# Patient Record
Sex: FEMALE | Race: BLACK OR AFRICAN AMERICAN | NOT HISPANIC OR LATINO | Employment: OTHER | URBAN - METROPOLITAN AREA
[De-identification: names, ages, dates, MRNs, and addresses within clinical notes are randomized per-mention and may not be internally consistent; named-entity substitution may affect disease eponyms.]

---

## 2018-07-16 LAB — RAPID GROUP A STREP (OHS): NEGATIVE

## 2019-08-02 LAB — RAPID GROUP A STREP (OHS): NEGATIVE

## 2022-04-11 ENCOUNTER — HISTORICAL (OUTPATIENT)
Dept: ADMINISTRATIVE | Facility: HOSPITAL | Age: 62
End: 2022-04-11
Payer: COMMERCIAL

## 2022-04-26 VITALS — DIASTOLIC BLOOD PRESSURE: 81 MMHG | OXYGEN SATURATION: 97 % | SYSTOLIC BLOOD PRESSURE: 131 MMHG

## 2022-05-04 ENCOUNTER — OFFICE VISIT (OUTPATIENT)
Dept: URGENT CARE | Facility: CLINIC | Age: 62
End: 2022-05-04
Payer: COMMERCIAL

## 2022-05-04 VITALS
OXYGEN SATURATION: 97 % | BODY MASS INDEX: 34.52 KG/M2 | TEMPERATURE: 99 F | SYSTOLIC BLOOD PRESSURE: 141 MMHG | HEART RATE: 68 BPM | WEIGHT: 202.19 LBS | HEIGHT: 64 IN | DIASTOLIC BLOOD PRESSURE: 86 MMHG | RESPIRATION RATE: 18 BRPM

## 2022-05-04 DIAGNOSIS — R09.81 NASAL CONGESTION: ICD-10-CM

## 2022-05-04 DIAGNOSIS — Z11.52 ENCOUNTER FOR SCREENING FOR COVID-19: Primary | ICD-10-CM

## 2022-05-04 LAB
FLUAV AG UPPER RESP QL IA.RAPID: NOT DETECTED
FLUBV AG UPPER RESP QL IA.RAPID: NOT DETECTED
SARS-COV-2 RNA RESP QL NAA+PROBE: NOT DETECTED

## 2022-05-04 PROCEDURE — 1159F MED LIST DOCD IN RCRD: CPT | Mod: CPTII,,, | Performed by: NURSE PRACTITIONER

## 2022-05-04 PROCEDURE — 1159F PR MEDICATION LIST DOCUMENTED IN MEDICAL RECORD: ICD-10-PCS | Mod: CPTII,,, | Performed by: NURSE PRACTITIONER

## 2022-05-04 PROCEDURE — 3079F DIAST BP 80-89 MM HG: CPT | Mod: CPTII,,, | Performed by: NURSE PRACTITIONER

## 2022-05-04 PROCEDURE — 99214 OFFICE O/P EST MOD 30 MIN: CPT | Mod: PBBFAC | Performed by: NURSE PRACTITIONER

## 2022-05-04 PROCEDURE — 99203 OFFICE O/P NEW LOW 30 MIN: CPT | Mod: S$PBB,,, | Performed by: NURSE PRACTITIONER

## 2022-05-04 PROCEDURE — 3008F BODY MASS INDEX DOCD: CPT | Mod: CPTII,,, | Performed by: NURSE PRACTITIONER

## 2022-05-04 PROCEDURE — 3008F PR BODY MASS INDEX (BMI) DOCUMENTED: ICD-10-PCS | Mod: CPTII,,, | Performed by: NURSE PRACTITIONER

## 2022-05-04 PROCEDURE — 3079F PR MOST RECENT DIASTOLIC BLOOD PRESSURE 80-89 MM HG: ICD-10-PCS | Mod: CPTII,,, | Performed by: NURSE PRACTITIONER

## 2022-05-04 PROCEDURE — 1160F PR REVIEW ALL MEDS BY PRESCRIBER/CLIN PHARMACIST DOCUMENTED: ICD-10-PCS | Mod: CPTII,,, | Performed by: NURSE PRACTITIONER

## 2022-05-04 PROCEDURE — 3077F SYST BP >= 140 MM HG: CPT | Mod: CPTII,,, | Performed by: NURSE PRACTITIONER

## 2022-05-04 PROCEDURE — 87636 SARSCOV2 & INF A&B AMP PRB: CPT | Performed by: NURSE PRACTITIONER

## 2022-05-04 PROCEDURE — 3077F PR MOST RECENT SYSTOLIC BLOOD PRESSURE >= 140 MM HG: ICD-10-PCS | Mod: CPTII,,, | Performed by: NURSE PRACTITIONER

## 2022-05-04 PROCEDURE — 99203 PR OFFICE/OUTPT VISIT, NEW, LEVL III, 30-44 MIN: ICD-10-PCS | Mod: S$PBB,,, | Performed by: NURSE PRACTITIONER

## 2022-05-04 PROCEDURE — 1160F RVW MEDS BY RX/DR IN RCRD: CPT | Mod: CPTII,,, | Performed by: NURSE PRACTITIONER

## 2022-05-04 RX ORDER — AMLODIPINE BESYLATE 5 MG/1
5 TABLET ORAL 2 TIMES DAILY
COMMUNITY
Start: 2022-03-30

## 2022-05-04 RX ORDER — TRAZODONE HYDROCHLORIDE 50 MG/1
50 TABLET ORAL
COMMUNITY

## 2022-05-04 RX ORDER — FLUTICASONE PROPIONATE 50 MCG
2 SPRAY, SUSPENSION (ML) NASAL DAILY
Qty: 16 G | Refills: 0 | Status: SHIPPED | OUTPATIENT
Start: 2022-05-04

## 2022-05-04 RX ORDER — BENZONATATE 200 MG/1
200 CAPSULE ORAL 3 TIMES DAILY PRN
Qty: 30 CAPSULE | Refills: 0 | Status: SHIPPED | OUTPATIENT
Start: 2022-05-04 | End: 2022-05-14

## 2022-05-04 RX ORDER — PANTOPRAZOLE SODIUM 40 MG/1
40 TABLET, DELAYED RELEASE ORAL DAILY
COMMUNITY
Start: 2022-04-18

## 2022-05-04 RX ORDER — CETIRIZINE HYDROCHLORIDE 10 MG/1
10 TABLET ORAL DAILY
Refills: 0 | COMMUNITY
Start: 2022-05-04 | End: 2023-05-04

## 2022-05-05 NOTE — PROGRESS NOTES
Subjective:      Patient ID: Gabriela Landin is a 61 y.o. female.    Chief Complaint: Otalgia, Cough, Headache, Nasal Congestion, and sinus drip     61-year-old female presents to the clinic reporting of earache, headache, nasal congestion. Patient states she was on a trip and 3 people on the trip at tested positive for flu. Patient denies any fever, or short of breath, or chest pain or any other symptoms    Review of Systems   Constitutional: Negative.    HENT: Positive for nasal congestion, ear pain and postnasal drip.    Eyes: Negative.    Respiratory: Negative.    Cardiovascular: Negative.    Gastrointestinal: Negative.    Endocrine: Negative.    Genitourinary: Negative.    Allergic/Immunologic: Negative.    Neurological: Negative.    Hematological: Negative.    Psychiatric/Behavioral: Negative.            Current Outpatient Medications:     amLODIPine (NORVASC) 5 MG tablet, Take 5 mg by mouth 2 (two) times daily., Disp: , Rfl:     benzonatate (TESSALON) 200 MG capsule, Take 1 capsule (200 mg total) by mouth 3 (three) times daily as needed for Cough., Disp: 30 capsule, Rfl: 0    cetirizine (ZYRTEC) 10 MG tablet, Take 1 tablet (10 mg total) by mouth once daily., Disp: , Rfl: 0    fluticasone propionate (FLONASE) 50 mcg/actuation nasal spray, 2 sprays (100 mcg total) by Each Nostril route once daily., Disp: 16 g, Rfl: 0    pantoprazole (PROTONIX) 40 MG tablet, Take 40 mg by mouth once daily., Disp: , Rfl:     traZODone (DESYREL) 50 MG tablet, Take 50 mg by mouth., Disp: , Rfl:     Objective:     Physical Exam  Constitutional:       Appearance: Normal appearance.   HENT:      Head: Normocephalic and atraumatic.      Right Ear: Tympanic membrane, ear canal and external ear normal.      Left Ear: Tympanic membrane, ear canal and external ear normal.      Nose: Congestion (Nasal turbinate boggy without erythema) present.      Comments: Scant  clear nasal discharge     Mouth/Throat:      Mouth: Mucous membranes  are moist.      Comments: Clear postnasal drip  Eyes:      Extraocular Movements: Extraocular movements intact.      Pupils: Pupils are equal, round, and reactive to light.   Cardiovascular:      Rate and Rhythm: Normal rate and regular rhythm.      Pulses: Normal pulses.      Heart sounds: Normal heart sounds.   Pulmonary:      Effort: Pulmonary effort is normal.      Breath sounds: Normal breath sounds.   Musculoskeletal:         General: Normal range of motion.      Cervical back: Normal range of motion and neck supple.   Skin:     General: Skin is warm.      Capillary Refill: Capillary refill takes less than 2 seconds.   Neurological:      General: No focal deficit present.      Mental Status: She is alert and oriented to person, place, and time.   Psychiatric:         Mood and Affect: Mood normal.         Behavior: Behavior normal.         Thought Content: Thought content normal.         Judgment: Judgment normal.       Assessment:     Problem List Items Addressed This Visit    None     Visit Diagnoses     Encounter for screening for COVID-19    -  Primary    Relevant Medications    cetirizine (ZYRTEC) 10 MG tablet    benzonatate (TESSALON) 200 MG capsule    fluticasone propionate (FLONASE) 50 mcg/actuation nasal spray    Other Relevant Orders    COVID/FLU A&B PCR    Nasal congestion        Relevant Medications    cetirizine (ZYRTEC) 10 MG tablet    benzonatate (TESSALON) 200 MG capsule    fluticasone propionate (FLONASE) 50 mcg/actuation nasal spray          Plan:   discussed with exam findings with patient   test lab results finding with patient, flu a and B, strep a PCR pending will notify results when they become available and treat accordingly  Discussed medication and use the patient, Rx Tessalon Perles 200 mg, Rx Flonase nasal spray, cetirizine 10 mg as directed  Continue social distancing, mask wearing, good hygiene, stay hydrated with fluids official water, take Tylenol or Motrin as directed on the  label for fever or body aches or earache  Instructed patient to notify his/ her primary care provider regarding the visit today and schedule a follow-up appointment in 2-3 days if needed   instructed patient to come back to the clinic or go to nearest emergency room department if symptoms worsens or no improvement or for any other reason   questions elicited and answered  Patient verbalized understanding of discharge instructions, verbalizes understanding of medication prescribed any issues, verbalizes understanding to read discharge instructions    Encounter for screening for COVID-19  -     COVID/FLU A&B PCR  -     cetirizine (ZYRTEC) 10 MG tablet; Take 1 tablet (10 mg total) by mouth once daily.; Refill: 0  -     benzonatate (TESSALON) 200 MG capsule; Take 1 capsule (200 mg total) by mouth 3 (three) times daily as needed for Cough.  Dispense: 30 capsule; Refill: 0  -     fluticasone propionate (FLONASE) 50 mcg/actuation nasal spray; 2 sprays (100 mcg total) by Each Nostril route once daily.  Dispense: 16 g; Refill: 0    Nasal congestion  -     cetirizine (ZYRTEC) 10 MG tablet; Take 1 tablet (10 mg total) by mouth once daily.; Refill: 0  -     benzonatate (TESSALON) 200 MG capsule; Take 1 capsule (200 mg total) by mouth 3 (three) times daily as needed for Cough.  Dispense: 30 capsule; Refill: 0  -     fluticasone propionate (FLONASE) 50 mcg/actuation nasal spray; 2 sprays (100 mcg total) by Each Nostril route once daily.  Dispense: 16 g; Refill: 0         Recent Lab Results     None

## 2022-09-03 ENCOUNTER — OFFICE VISIT (OUTPATIENT)
Dept: URGENT CARE | Facility: CLINIC | Age: 62
End: 2022-09-03
Payer: COMMERCIAL

## 2022-09-03 VITALS
BODY MASS INDEX: 34.38 KG/M2 | DIASTOLIC BLOOD PRESSURE: 79 MMHG | RESPIRATION RATE: 20 BRPM | WEIGHT: 194 LBS | TEMPERATURE: 99 F | HEIGHT: 63 IN | HEART RATE: 59 BPM | SYSTOLIC BLOOD PRESSURE: 123 MMHG | OXYGEN SATURATION: 96 %

## 2022-09-03 DIAGNOSIS — J02.9 SORE THROAT: Primary | ICD-10-CM

## 2022-09-03 DIAGNOSIS — H92.03 OTALGIA OF BOTH EARS: ICD-10-CM

## 2022-09-03 DIAGNOSIS — R68.89 FLU-LIKE SYMPTOMS: ICD-10-CM

## 2022-09-03 DIAGNOSIS — Z11.52 ENCOUNTER FOR SCREENING FOR COVID-19: ICD-10-CM

## 2022-09-03 DIAGNOSIS — R09.81 NASAL CONGESTION: ICD-10-CM

## 2022-09-03 LAB
CTP QC/QA: YES
FLUAV AG NPH QL: NEGATIVE
FLUBV AG NPH QL: NEGATIVE
S PYO RRNA THROAT QL PROBE: NEGATIVE
SARS-COV-2 RDRP RESP QL NAA+PROBE: NEGATIVE

## 2022-09-03 PROCEDURE — 87081 CULTURE SCREEN ONLY: CPT | Performed by: NURSE PRACTITIONER

## 2022-09-03 PROCEDURE — 87804 INFLUENZA ASSAY W/OPTIC: CPT | Mod: 59,PBBFAC | Performed by: NURSE PRACTITIONER

## 2022-09-03 PROCEDURE — 99215 OFFICE O/P EST HI 40 MIN: CPT | Mod: PBBFAC | Performed by: NURSE PRACTITIONER

## 2022-09-03 PROCEDURE — 99203 OFFICE O/P NEW LOW 30 MIN: CPT | Mod: S$PBB,,, | Performed by: NURSE PRACTITIONER

## 2022-09-03 PROCEDURE — 99203 PR OFFICE/OUTPT VISIT, NEW, LEVL III, 30-44 MIN: ICD-10-PCS | Mod: S$PBB,,, | Performed by: NURSE PRACTITIONER

## 2022-09-03 PROCEDURE — 87880 STREP A ASSAY W/OPTIC: CPT | Mod: PBBFAC | Performed by: NURSE PRACTITIONER

## 2022-09-03 PROCEDURE — U0002 COVID-19 LAB TEST NON-CDC: HCPCS | Mod: PBBFAC | Performed by: NURSE PRACTITIONER

## 2022-09-03 RX ORDER — SERTRALINE HYDROCHLORIDE 50 MG/1
25 TABLET, FILM COATED ORAL DAILY
COMMUNITY
Start: 2022-08-09

## 2022-09-03 RX ORDER — ROSUVASTATIN CALCIUM 10 MG/1
10 TABLET, COATED ORAL DAILY
COMMUNITY
Start: 2022-08-11

## 2022-09-03 RX ORDER — ALBUTEROL SULFATE 90 UG/1
AEROSOL, METERED RESPIRATORY (INHALATION)
COMMUNITY
Start: 2022-08-19

## 2022-09-03 NOTE — PROGRESS NOTES
"Subjective:       Patient ID: Gabriela Landin is a 62 y.o. female.    Vitals:  height is 5' 3" (1.6 m) and weight is 88 kg (194 lb). Her temperature is 98.6 °F (37 °C). Her blood pressure is 123/79 and her pulse is 59 (abnormal). Her respiration is 20 and oxygen saturation is 96%.     Chief Complaint: Sore Throat (Mainly on right side), Otalgia (Bilateral ), Headache, and Nasal Congestion    She started with sore throat, intermittent headache, bilateral ear pain and nasal congestion x 1 week. She denies sore throat today.       Son with similar symptoms.       Constitution: Negative for activity change, appetite change and fever.   HENT:  Positive for ear pain (bilateral), congestion and sore throat.    Eyes:  Negative for eye discharge.   Respiratory:  Negative for cough, shortness of breath and wheezing.    Gastrointestinal:  Negative for abdominal pain, nausea, vomiting and diarrhea.   Genitourinary:  Negative for dysuria, frequency, urgency and urine decreased.   Musculoskeletal:  Negative for pain.   Skin:  Negative for rash.   Neurological:  Positive for headaches. Negative for dizziness.     Objective:      Physical Exam   Constitutional: She is oriented to person, place, and time.  Non-toxic appearance. No distress.   HENT:   Head: Normocephalic and atraumatic.   Ears:   Right Ear: Tympanic membrane and ear canal normal.   Left Ear: Tympanic membrane and ear canal normal.   Nose: Congestion present. No rhinorrhea.   Mouth/Throat: Mucous membranes are moist. No oropharyngeal exudate or posterior oropharyngeal erythema. Oropharynx is clear.   Eyes: Pupils are equal, round, and reactive to light.   Neck: Neck supple.   Cardiovascular: Normal rate, regular rhythm, normal heart sounds and normal pulses.   Pulmonary/Chest: Effort normal and breath sounds normal. No respiratory distress.   Abdominal: Normal appearance and bowel sounds are normal. She exhibits no distension. Soft. There is no abdominal tenderness. "   Musculoskeletal: Normal range of motion.         General: Normal range of motion.   Lymphadenopathy:     She has no cervical adenopathy.   Neurological: no focal deficit. She is alert and oriented to person, place, and time.   Skin: Skin is warm and dry. Capillary refill takes less than 2 seconds.   Nursing note and vitals reviewed.      Assessment:       1. Sore throat    2. Nasal congestion    3. Otalgia of both ears    4. Encounter for screening for COVID-19    5. Flu-like symptoms          Plan:         Sore throat  -     POCT rapid strep A    Nasal congestion    Otalgia of both ears    Encounter for screening for COVID-19  -     POCT COVID-19 Rapid Screening    Flu-like symptoms  -     POCT Influenza A/B       -Provided reassurance that both ears appear normal on exam.   -Flu, strep and COVID tests are negative.   -Continue home Zyrtec and Flonase.   -May do warm salt water gargles as needed for sore throat.   -May use nasal saline spray and cool mist humidifier.   -Stay well hydrated and get plenty of rest.   -Please follow instructions on patient education material.  Return to urgent care in 2 to 3 days if symptoms are not improving, immediately if you develop any new or worsening symptoms.        Results for orders placed or performed in visit on 09/03/22   POCT COVID-19 Rapid Screening   Result Value Ref Range    POC Rapid COVID Negative Negative     Acceptable Yes    POCT Influenza A/B   Result Value Ref Range    Rapid Influenza A Ag Negative Negative    Rapid Influenza B Ag Negative Negative     Acceptable Yes    POCT rapid strep A   Result Value Ref Range    Rapid Strep A Screen Negative Negative     Acceptable Yes

## 2022-09-03 NOTE — LETTER
September 3, 2022      [facility]  [facility] [address]  Phone: [phone]       Patient: Gabriela Landin   YOB: 1960  Date of Visit: 09/03/2022    To Whom It May Concern:    Robel Landin  was at [facility] on 09/03/2022. The patient may return to work/school on 09/06/2022 with no restrictions. If you have any questions or concerns, or if I can be of further assistance, please do not hesitate to contact me.    Sincerely,    Chiqui Henao NP

## 2022-09-03 NOTE — PATIENT INSTRUCTIONS
-Continue home Zyrtec and Flonase.   -May do warm salt water gargles as needed for sore throat.   -May use nasal saline spray and cool mist humidifier.   -Stay well hydrated and get plenty of rest.   -Please follow instructions on patient education material.  Return to urgent care in 2 to 3 days if symptoms are not improving, immediately if you develop any new or worsening symptoms.

## 2022-09-06 ENCOUNTER — OFFICE VISIT (OUTPATIENT)
Dept: URGENT CARE | Facility: CLINIC | Age: 62
End: 2022-09-06
Payer: COMMERCIAL

## 2022-09-06 VITALS
SYSTOLIC BLOOD PRESSURE: 147 MMHG | WEIGHT: 194 LBS | BODY MASS INDEX: 34.38 KG/M2 | HEIGHT: 63 IN | OXYGEN SATURATION: 98 % | HEART RATE: 61 BPM | DIASTOLIC BLOOD PRESSURE: 87 MMHG | RESPIRATION RATE: 18 BRPM | TEMPERATURE: 98 F

## 2022-09-06 DIAGNOSIS — Z11.52 ENCOUNTER FOR SCREENING FOR COVID-19: Primary | ICD-10-CM

## 2022-09-06 LAB
BACTERIA THROAT CULT: NORMAL
FLUAV AG UPPER RESP QL IA.RAPID: NOT DETECTED
FLUBV AG UPPER RESP QL IA.RAPID: NOT DETECTED
RSV A 5' UTR RNA NPH QL NAA+PROBE: NOT DETECTED
SARS-COV-2 RNA RESP QL NAA+PROBE: NOT DETECTED

## 2022-09-06 PROCEDURE — 99214 OFFICE O/P EST MOD 30 MIN: CPT | Mod: PBBFAC | Performed by: FAMILY MEDICINE

## 2022-09-06 PROCEDURE — 99213 PR OFFICE/OUTPT VISIT, EST, LEVL III, 20-29 MIN: ICD-10-PCS | Mod: S$PBB,,, | Performed by: FAMILY MEDICINE

## 2022-09-06 PROCEDURE — 87636 SARSCOV2 & INF A&B AMP PRB: CPT | Performed by: FAMILY MEDICINE

## 2022-09-06 PROCEDURE — 99213 OFFICE O/P EST LOW 20 MIN: CPT | Mod: S$PBB,,, | Performed by: FAMILY MEDICINE

## 2022-09-06 NOTE — PROGRESS NOTES
"Subjective:       Patient ID: Gabriela Landin is a 62 y.o. female.    Vitals:  height is 5' 3" (1.6 m) and weight is 88 kg (194 lb 0.1 oz). Her temperature is 98.2 °F (36.8 °C). Her blood pressure is 147/87 (abnormal) and her pulse is 61. Her respiration is 18 and oxygen saturation is 98%.     Chief Complaint: Headache and Fatigue (Seen on 9/3  ALL SWABS NEGATIVE)    HPI  Son with COVID.  Patient with mild headache, no neuro symptoms, fatigue.  No cough or shortness of breath.  No fever.  Had negative testing 3 days ago  ROS    Constitutional: negative except as stated in HPI  Eye: negative except as stated in HPI  ENT: negative except as stated in HPI  Respiratory: negative except as stated in HPI  Cardiovascular: negative except as stated in HPI  Gastrointestinal: negative except as stated in HPI  Genitourinary: negative except as stated in HPI  Objective:      Physical Exam    GENERAL:  Nontoxic.  Well developed and well nourished.   Appears well hydrated.  No respiratory distress  HEAD:  No signs of head trauma.  EYES:  Pupils are equal.  Extraocular motions intact  NOSE:  No sinus tenderness  NECK:  Supple, nontender, no masses.  Full range of motion without pain.  No meningismus     CHEST:  nontender to palpation, clear breath sounds bilaterally, no wheezes, crackles, or rhonchi.  No increased work of breathing  CARDIOVASCULAR:  Regular      Assessment:       1. Encounter for screening for COVID-19            Plan:         Encounter for screening for COVID-19  -     COVID/RSV/FLU A&B PCR         Notify with PCR results.  Discussed COVID 19 precautions.  Please follow instructions on patient education material.  Return to urgent care in 2 to 3 days if symptoms are not improving, immediately if you develop any new or worsening symptoms.            "

## 2022-09-06 NOTE — LETTER
September 6, 2022      [facility]  [facility] [facility] [address]  Phone: [phone]       Patient: Gabriela Landin   YOB: 1960  Date of Visit: 09/06/2022    To Whom It May Concern:    Robel Landin  was at [facility] on 09/06/2022. The patient may not return to work/school until results received. If you have any questions or concerns, or if I can be of further assistance, please do not hesitate to contact me.    Sincerely,    KIERRA IRELAND MD

## 2022-09-22 ENCOUNTER — HISTORICAL (OUTPATIENT)
Dept: ADMINISTRATIVE | Facility: HOSPITAL | Age: 62
End: 2022-09-22
Payer: COMMERCIAL

## 2022-12-27 ENCOUNTER — OFFICE VISIT (OUTPATIENT)
Dept: URGENT CARE | Facility: CLINIC | Age: 62
End: 2022-12-27
Payer: COMMERCIAL

## 2022-12-27 VITALS
DIASTOLIC BLOOD PRESSURE: 88 MMHG | HEART RATE: 79 BPM | OXYGEN SATURATION: 97 % | SYSTOLIC BLOOD PRESSURE: 146 MMHG | RESPIRATION RATE: 20 BRPM | BODY MASS INDEX: 34.91 KG/M2 | WEIGHT: 197 LBS | TEMPERATURE: 99 F | HEIGHT: 63 IN

## 2022-12-27 DIAGNOSIS — R05.9 COUGH, UNSPECIFIED TYPE: Primary | ICD-10-CM

## 2022-12-27 DIAGNOSIS — U07.1 COVID-19 VIRUS DETECTED: ICD-10-CM

## 2022-12-27 DIAGNOSIS — U07.1 COVID-19: ICD-10-CM

## 2022-12-27 LAB
CTP QC/QA: YES
CTP QC/QA: YES
FLUAV AG NPH QL: NEGATIVE
FLUBV AG NPH QL: NEGATIVE
SARS-COV-2 RDRP RESP QL NAA+PROBE: POSITIVE

## 2022-12-27 PROCEDURE — 99214 OFFICE O/P EST MOD 30 MIN: CPT | Mod: S$PBB,,, | Performed by: FAMILY MEDICINE

## 2022-12-27 PROCEDURE — 99214 PR OFFICE/OUTPT VISIT, EST, LEVL IV, 30-39 MIN: ICD-10-PCS | Mod: S$PBB,,, | Performed by: FAMILY MEDICINE

## 2022-12-27 PROCEDURE — 99214 OFFICE O/P EST MOD 30 MIN: CPT | Mod: PBBFAC | Performed by: FAMILY MEDICINE

## 2022-12-27 PROCEDURE — 87635 SARS-COV-2 COVID-19 AMP PRB: CPT | Mod: PBBFAC | Performed by: FAMILY MEDICINE

## 2022-12-27 PROCEDURE — 87804 INFLUENZA ASSAY W/OPTIC: CPT | Mod: 59,PBBFAC | Performed by: FAMILY MEDICINE

## 2022-12-27 RX ORDER — PREDNISONE 20 MG/1
20 TABLET ORAL DAILY
Qty: 5 TABLET | Refills: 0 | Status: SHIPPED | OUTPATIENT
Start: 2022-12-27 | End: 2023-01-01

## 2022-12-27 NOTE — LETTER
December 27, 2022      [facility]  [facility] [facility] [address]  Phone: [phone]       Patient: Gabriela Landin   YOB: 1960  Date of Visit: 12/27/2022    To Whom It May Concern:    Robel Landin  was at [facility] on 12/27/2022. The patient may return to work/school on 12/31/2022 with no restrictions. If you have any questions or concerns, or if I can be of further assistance, please do not hesitate to contact me.    Sincerely,    Dr. Adan Stevens

## 2022-12-28 NOTE — PROGRESS NOTES
"Subjective:       Patient ID: Gabriela Landin is a 62 y.o. female.    Chief Complaint: Cough (Covid test and flu test)      HPI  63 yo female with cough for a few days.  No known sick contacts.   Review of Systems   Respiratory:          As above       Objective:       Vital Signs  Temp: 99.2 °F (37.3 °C)  Temp src: Oral  Pulse: 79  Resp: 20  SpO2: 97 %  BP: (!) 146/88  BP Location: Right arm  Patient Position: Sitting  Pain Score:   8  Pain Loc: Generalized  Height and Weight  Height: 5' 3" (160 cm)  Weight: 89.4 kg (197 lb)  BSA (Calculated - sq m): 1.99 sq meters  BMI (Calculated): 34.9  Weight in (lb) to have BMI = 25: 140.8]  Physical Exam  Constitutional:       Appearance: Normal appearance.   HENT:      Head: Normocephalic and atraumatic.      Nose: Congestion present. No rhinorrhea.      Mouth/Throat:      Pharynx: Posterior oropharyngeal erythema present. No oropharyngeal exudate.   Eyes:      Extraocular Movements: Extraocular movements intact.      Conjunctiva/sclera: Conjunctivae normal.   Cardiovascular:      Rate and Rhythm: Normal rate and regular rhythm.      Heart sounds: Normal heart sounds.   Pulmonary:      Breath sounds: Normal breath sounds.   Musculoskeletal:      Cervical back: No tenderness.   Lymphadenopathy:      Cervical: No cervical adenopathy.   Skin:     General: Skin is warm and dry.   Neurological:      General: No focal deficit present.      Mental Status: She is alert.   Psychiatric:         Mood and Affect: Mood and affect normal.         Speech: Speech normal.         Behavior: Behavior normal. Behavior is cooperative.         Thought Content: Thought content does not include homicidal or suicidal ideation.       Assessment:       Problem List Items Addressed This Visit    None  Visit Diagnoses       Cough, unspecified type    -  Primary    Relevant Medications    predniSONE (DELTASONE) 20 MG tablet    Other Relevant Orders    POCT COVID-19 Rapid Screening (Completed)    POCT " Influenza A/B (Completed)    COVID-19                  Plan:    Encouraged ibuprofen/acetaminophen as needed  COVID precautions  ER precautions  Follow-up with PCP

## 2022-12-30 ENCOUNTER — NURSE TRIAGE (OUTPATIENT)
Dept: ADMINISTRATIVE | Facility: CLINIC | Age: 62
End: 2022-12-30
Payer: COMMERCIAL

## 2022-12-30 NOTE — TELEPHONE ENCOUNTER
[facility] call -     Pt c/o yesterday felt really drained more than before, went to  on tues, coughing up yellow mucous but now clear sputum, lots of sneezing, yesterday had diarrhea but not now, HA, now mucous is clear. Covid protocol followed and pt advised to tx at home. Education provided, see care advice. Instructed Pt to call ooc if her symptoms worsen or if she has any future question at 1 [phone]. Alert and oriented, Pt agrees with above. Pt had no additional questions. No [facility] pcp listed to route encounter to at this time.   Reason for Disposition   [1] COVID-19 diagnosed by positive lab test (e.g., PCR, rapid self-test kit) AND [2] mild symptoms (e.g., cough, fever, others) AND [3] no complications or SOB    Additional Information   Negative: SEVERE difficulty breathing (e.g., struggling for each breath, speaks in single words)   Negative: Difficult to awaken or acting confused (e.g., disoriented, slurred speech)   Negative: Bluish (or gray) lips or face now   Negative: Shock suspected (e.g., cold/pale/clammy skin, too weak to stand, low BP, rapid pulse)   Negative: Sounds like a life-threatening emergency to the triager   Negative: SEVERE or constant chest pain or pressure  (Exception: Mild central chest pain, present only when coughing.)   Negative: MODERATE difficulty breathing (e.g., speaks in phrases, SOB even at rest, pulse 100-120)   Negative: Headache and stiff neck (can't touch chin to chest)   Negative: Chest pain or pressure  (Exception: MILD central chest pain, present only when coughing.)   Negative: Patient sounds very sick or weak to the triager   Negative: MILD difficulty breathing (e.g., minimal/no SOB at rest, SOB with walking, pulse <100)   Negative: Fever > 103 F (39.4 C)   Negative: [1] Fever > 101 F (38.3 C) AND [2] over 60 years of age   Negative: [1] Fever > 100.0 F (37.8 C) AND [2] bedridden (e.g., CVA, chronic illness, recovering from surgery)   Negative: [1] HIGH RISK for severe  COVID complications (e.g., weak immune system, age > 64 years, obesity with BMI of 30 or higher, pregnant, chronic lung disease or other chronic medical condition) AND [2] COVID symptoms (e.g., cough, fever)  (Exceptions: Already seen by PCP and no new or worsening symptoms.)   Negative: [1] HIGH RISK patient AND [2] influenza is widespread in the community AND [3] ONE OR MORE respiratory symptoms: cough, sore throat, runny or stuffy nose   Negative: [1] HIGH RISK patient AND [2] influenza exposure within the last 7 days AND [3] ONE OR MORE respiratory symptoms: cough, sore throat, runny or stuffy nose   Negative: [1] COVID-19 infection suspected by caller or triager AND [2] mild symptoms (cough, fever, or others) AND [3] negative COVID-19 rapid test   Negative: Fever present > 3 days (72 hours)   Negative: [1] Fever returns after gone for over 24 hours AND [2] symptoms worse or not improved   Negative: [1] Continuous (nonstop) coughing interferes with work or school AND [2] no improvement using cough treatment per Care Advice   Negative: Cough present > 3 weeks   Negative: [1] COVID-19 diagnosed by positive lab test (e.g., PCR, rapid self-test kit) AND [2] NO symptoms (e.g., cough, fever, others)    Protocols used: Coronavirus (COVID-19) Diagnosed or [id number]

## 2023-02-08 ENCOUNTER — PATIENT MESSAGE (OUTPATIENT)
Dept: RESEARCH | Facility: HOSPITAL | Age: 63
End: 2023-02-08
Payer: COMMERCIAL

## 2023-04-25 ENCOUNTER — PATIENT MESSAGE (OUTPATIENT)
Dept: RESEARCH | Facility: HOSPITAL | Age: 63
End: 2023-04-25
Payer: COMMERCIAL

## 2023-05-09 ENCOUNTER — PATIENT MESSAGE (OUTPATIENT)
Dept: RESEARCH | Facility: HOSPITAL | Age: 63
End: 2023-05-09
Payer: COMMERCIAL

## 2023-09-07 ENCOUNTER — PATIENT MESSAGE (OUTPATIENT)
Dept: RESEARCH | Facility: HOSPITAL | Age: 63
End: 2023-09-07
Payer: COMMERCIAL

## 2023-11-09 ENCOUNTER — OFFICE VISIT (OUTPATIENT)
Dept: URGENT CARE | Facility: CLINIC | Age: 63
End: 2023-11-09
Payer: COMMERCIAL

## 2023-11-09 VITALS
BODY MASS INDEX: 34.59 KG/M2 | HEART RATE: 74 BPM | DIASTOLIC BLOOD PRESSURE: 87 MMHG | WEIGHT: 202.63 LBS | RESPIRATION RATE: 20 BRPM | OXYGEN SATURATION: 99 % | HEIGHT: 64 IN | TEMPERATURE: 98 F | SYSTOLIC BLOOD PRESSURE: 131 MMHG

## 2023-11-09 DIAGNOSIS — J02.9 SORE THROAT: Primary | ICD-10-CM

## 2023-11-09 DIAGNOSIS — B37.0 ORAL THRUSH: ICD-10-CM

## 2023-11-09 LAB
CTP QC/QA: YES
MOLECULAR STREP A: NEGATIVE

## 2023-11-09 PROCEDURE — 99214 OFFICE O/P EST MOD 30 MIN: CPT | Mod: PBBFAC | Performed by: STUDENT IN AN ORGANIZED HEALTH CARE EDUCATION/TRAINING PROGRAM

## 2023-11-09 PROCEDURE — 99213 OFFICE O/P EST LOW 20 MIN: CPT | Mod: S$PBB,,, | Performed by: STUDENT IN AN ORGANIZED HEALTH CARE EDUCATION/TRAINING PROGRAM

## 2023-11-09 PROCEDURE — 87651 STREP A DNA AMP PROBE: CPT | Mod: PBBFAC | Performed by: STUDENT IN AN ORGANIZED HEALTH CARE EDUCATION/TRAINING PROGRAM

## 2023-11-09 PROCEDURE — 99213 PR OFFICE/OUTPT VISIT, EST, LEVL III, 20-29 MIN: ICD-10-PCS | Mod: S$PBB,,, | Performed by: STUDENT IN AN ORGANIZED HEALTH CARE EDUCATION/TRAINING PROGRAM

## 2023-11-09 RX ORDER — MELOXICAM 7.5 MG/1
7.5 TABLET ORAL DAILY PRN
COMMUNITY
Start: 2023-10-02

## 2023-11-09 RX ORDER — FLUCONAZOLE 100 MG/1
100 TABLET ORAL DAILY
Qty: 8 TABLET | Refills: 0 | Status: SHIPPED | OUTPATIENT
Start: 2023-11-09 | End: 2023-11-10

## 2023-11-10 RX ORDER — FLUCONAZOLE 100 MG/1
100 TABLET ORAL DAILY
Qty: 10 TABLET | Refills: 0 | Status: SHIPPED | OUTPATIENT
Start: 2023-11-10 | End: 2023-11-10 | Stop reason: SDUPTHER

## 2023-11-10 RX ORDER — FLUCONAZOLE 100 MG/1
100 TABLET ORAL DAILY
Qty: 10 TABLET | Refills: 0 | Status: SHIPPED | OUTPATIENT
Start: 2023-11-10 | End: 2023-11-20

## 2023-11-10 NOTE — PROGRESS NOTES
Pharmacy has discrepancy with Diflucan quantity, reviewed Dr. Pizano's note. Updated Rx; 100 mg Diflucan once a day for 10 days for oral thrush treatment.

## 2023-11-10 NOTE — PROGRESS NOTES
"Subjective:      Patient ID: Gabriela Landin is a 63 y.o. female.    Vitals:  height is 5' 4" (1.626 m) and weight is 91.9 kg (202 lb 9.6 oz). Her temperature is 98.4 °F (36.9 °C). Her blood pressure is 131/87 and her pulse is 74. Her respiration is 20 and oxygen saturation is 99%.     Chief Complaint: Sore Throat (X months, states recently completed abx)    Sore Throat       Gabriela Landin is a 63-year-old female presenting to the urgent care clinic today with sore throat that has been going on for the past couple of months.  Patient states that she was recently seen by her primary care physician and was treated with levofloxacin for sinusitis/sore throat.  Patient now complaining of some nasal congestion/sore throat.  She denies any chest pains, shortness of breath, wheezing, fevers, chills.  No weight changes.  No GI or  symptoms.    HENT:  Positive for sore throat.       Objective:     Physical Exam   Constitutional: She is oriented to person, place, and time. She appears well-developed. She is cooperative.  Non-toxic appearance. She does not appear ill. No distress.   HENT:   Head: Normocephalic and atraumatic.   Ears:   Right Ear: Hearing, tympanic membrane, external ear and ear canal normal.   Left Ear: Hearing, tympanic membrane, external ear and ear canal normal.   Nose: Nose normal. No mucosal edema, rhinorrhea or nasal deformity. No epistaxis. Right sinus exhibits no maxillary sinus tenderness and no frontal sinus tenderness. Left sinus exhibits no maxillary sinus tenderness and no frontal sinus tenderness.   Mouth/Throat: Uvula is midline and mucous membranes are normal. No trismus in the jaw. Normal dentition. No uvula swelling. Posterior oropharyngeal erythema present. No oropharyngeal exudate or posterior oropharyngeal edema.      Comments: Thrush noted on the tongue  Eyes: Conjunctivae and lids are normal. No scleral icterus.   Neck: Trachea normal and phonation normal. Neck supple. No edema " present. No erythema present. No neck rigidity present.   Cardiovascular: Normal rate, regular rhythm, normal heart sounds and normal pulses.   Pulmonary/Chest: Effort normal and breath sounds normal. No respiratory distress. She has no decreased breath sounds. She has no rhonchi.   Abdominal: Normal appearance.   Musculoskeletal: Normal range of motion.         General: No deformity. Normal range of motion.   Neurological: She is alert and oriented to person, place, and time. She exhibits normal muscle tone. Coordination normal.   Skin: Skin is warm, dry, intact, not diaphoretic and not pale.   Psychiatric: Her speech is normal and behavior is normal. Judgment and thought content normal.   Nursing note and vitals reviewed.      Assessment:     1. Sore throat    2. Oral thrush        Plan:     Patient presents to the urgent care clinic today with sore throat that has been going on for the past couple of months and that has not been improving despite antibiotics from her primary care physician.  Patient noted to have oral thrush today we will treat patient symptomatically with Diflucan.  Recommended the patient follow up with her primary care physician to get further evaluated for any immuno compromising conditions.    Sore throat  -     POCT Strep A, Molecular    Oral thrush    Other orders  -     fluconazole (DIFLUCAN) 100 MG tablet; Take 1 tablet (100 mg total) by mouth once daily. for 10 days  Dispense: 8 tablet; Refill: 0      This note is dictated using the M*Modal Fluency Direct word recognition program. There are word recognition mistakes that are occasionally missed on review.    Enzo Ayala MD

## 2024-08-25 ENCOUNTER — OFFICE VISIT (OUTPATIENT)
Dept: URGENT CARE | Facility: CLINIC | Age: 64
End: 2024-08-25
Payer: COMMERCIAL

## 2024-08-25 VITALS
HEIGHT: 63 IN | OXYGEN SATURATION: 97 % | TEMPERATURE: 99 F | BODY MASS INDEX: 34.84 KG/M2 | HEART RATE: 72 BPM | SYSTOLIC BLOOD PRESSURE: 138 MMHG | DIASTOLIC BLOOD PRESSURE: 84 MMHG | WEIGHT: 196.63 LBS | RESPIRATION RATE: 20 BRPM

## 2024-08-25 DIAGNOSIS — R05.9 COUGH, UNSPECIFIED TYPE: ICD-10-CM

## 2024-08-25 DIAGNOSIS — K21.9 GASTROESOPHAGEAL REFLUX DISEASE, UNSPECIFIED WHETHER ESOPHAGITIS PRESENT: ICD-10-CM

## 2024-08-25 DIAGNOSIS — R30.0 DYSURIA: ICD-10-CM

## 2024-08-25 DIAGNOSIS — N30.01 ACUTE CYSTITIS WITH HEMATURIA: Primary | ICD-10-CM

## 2024-08-25 LAB
BACTERIA #/AREA URNS AUTO: ABNORMAL /HPF
BILIRUB UR QL STRIP.AUTO: ABNORMAL
BILIRUB UR QL STRIP: POSITIVE
CLARITY UR: ABNORMAL
COLOR UR AUTO: ABNORMAL
CTP QC/QA: YES
GLUCOSE UR QL STRIP: NEGATIVE
GLUCOSE UR QL STRIP: NORMAL
HGB UR QL STRIP: NEGATIVE
HYALINE CASTS #/AREA URNS LPF: ABNORMAL /LPF
KETONES UR QL STRIP: ABNORMAL
KETONES UR QL STRIP: POSITIVE
LEUKOCYTE ESTERASE UR QL STRIP: 500
LEUKOCYTE ESTERASE UR QL STRIP: POSITIVE
MUCOUS THREADS URNS QL MICRO: ABNORMAL /LPF
NITRITE UR QL STRIP: NEGATIVE
NON-SQ EPI CELLS URNS QL MICRO: ABNORMAL /HPF
PH UR STRIP: 6 [PH]
PH, POC UA: 6
POC BLOOD, URINE: POSITIVE
POC NITRATES, URINE: NEGATIVE
PROT UR QL STRIP: ABNORMAL
PROT UR QL STRIP: POSITIVE
RBC #/AREA URNS AUTO: ABNORMAL /HPF
SARS-COV-2 AG RESP QL IA.RAPID: NEGATIVE
SP GR UR STRIP.AUTO: 1.03 (ref 1–1.03)
SP GR UR STRIP: 1.02 (ref 1–1.03)
SQUAMOUS #/AREA URNS LPF: ABNORMAL /HPF
UROBILINOGEN UR STRIP-ACNC: ABNORMAL
UROBILINOGEN UR STRIP-ACNC: ABNORMAL (ref 0.1–1.1)
WBC #/AREA URNS AUTO: ABNORMAL /HPF

## 2024-08-25 PROCEDURE — 87811 SARS-COV-2 COVID19 W/OPTIC: CPT | Mod: PBBFAC

## 2024-08-25 PROCEDURE — 81003 URINALYSIS AUTO W/O SCOPE: CPT | Mod: PBBFAC

## 2024-08-25 PROCEDURE — 87086 URINE CULTURE/COLONY COUNT: CPT

## 2024-08-25 PROCEDURE — 99213 OFFICE O/P EST LOW 20 MIN: CPT | Mod: S$PBB,,,

## 2024-08-25 PROCEDURE — 81001 URINALYSIS AUTO W/SCOPE: CPT

## 2024-08-25 PROCEDURE — 99215 OFFICE O/P EST HI 40 MIN: CPT | Mod: PBBFAC

## 2024-08-25 RX ORDER — FAMOTIDINE 20 MG/1
20 TABLET, FILM COATED ORAL 2 TIMES DAILY
Qty: 60 TABLET | Refills: 0 | Status: SHIPPED | OUTPATIENT
Start: 2024-08-25 | End: 2024-09-24

## 2024-08-25 RX ORDER — EVOLOCUMAB 140 MG/ML
140 INJECTION, SOLUTION SUBCUTANEOUS
COMMUNITY
Start: 2024-08-15

## 2024-08-25 RX ORDER — NITROFURANTOIN 25; 75 MG/1; MG/1
100 CAPSULE ORAL 2 TIMES DAILY
Qty: 14 CAPSULE | Refills: 0 | Status: SHIPPED | OUTPATIENT
Start: 2024-08-25 | End: 2024-09-01

## 2024-08-25 RX ORDER — HYDROCODONE BITARTRATE AND ACETAMINOPHEN 7.5; 325 MG/1; MG/1
TABLET ORAL
COMMUNITY
Start: 2024-04-04

## 2024-08-25 RX ORDER — IBANDRONATE SODIUM 150 MG/1
TABLET, FILM COATED ORAL
COMMUNITY
Start: 2024-04-13

## 2024-08-25 RX ORDER — FAMOTIDINE 20 MG/1
20 TABLET, FILM COATED ORAL
COMMUNITY
Start: 2024-08-15

## 2024-08-25 RX ORDER — ONDANSETRON 8 MG/1
8 TABLET, ORALLY DISINTEGRATING ORAL EVERY 6 HOURS PRN
Qty: 10 TABLET | Refills: 1 | Status: SHIPPED | OUTPATIENT
Start: 2024-08-25

## 2024-08-25 NOTE — LETTER
August 25, 2024      [facility]  [facility] [address]  Phone: [phone]       Patient: Gabriela Landin   YOB: 1960  Date of Visit: 08/25/2024    To Whom It May Concern:    Robel Landin  was at [facility] on 08/25/2024. The patient may return to work/school on AUG 27 2024 with no restrictions. If you have any questions or concerns, or if I can be of further assistance, please do not hesitate to contact me.    Sincerely,    KIERRA AGUDELO NP

## 2024-08-25 NOTE — PROGRESS NOTES
"Subjective:       Patient ID: Gabriela Landin is a 64 y.o. female.    Vitals:  height is 5' 3" (1.6 m) and weight is 89.2 kg (196 lb 10.4 oz). Her temperature is 98.7 °F (37.1 °C). Her blood pressure is 138/84 and her pulse is 72. Her respiration is 20 and oxygen saturation is 97%.     Chief Complaint: Dysuria (X 2days) and Cough (X 2days)    64-year-old female presents to clinic with complaints increasing acid reflux, dysuria, and vomiting x4 occurrences in the past 2 days.  Patient reports she does not have much of an appetite and has also been unusually fatigued.  Patient reports she takes daily Protonix and Pepcid once a day for acid reflux and describes her current acid reflux as coming in waves traveling up her stomach and into her throat and calming down about 15-20 minutes later with relief of symptoms.  Patient denies fever, diarrhea, chills, headache, body aches, rash.    All other systems are negative    Chart reviewed    Objective:   Physical Exam   Constitutional: She appears well-developed.  Non-toxic appearance. She does not appear ill. No distress.   HENT:   Head: Normocephalic and atraumatic.   Ears:   Right Ear: Hearing, tympanic membrane, external ear and ear canal normal.   Left Ear: Hearing, tympanic membrane, external ear and ear canal normal.   Nose: Mucosal edema and rhinorrhea present. No purulent discharge. Right sinus exhibits no maxillary sinus tenderness and no frontal sinus tenderness. Left sinus exhibits no maxillary sinus tenderness and no frontal sinus tenderness.   Mouth/Throat: Uvula is midline. Oropharyngeal exudate, posterior oropharyngeal edema and posterior oropharyngeal erythema present.   Eyes: Right eye exhibits no discharge. Left eye exhibits no discharge. Extraocular movement intact   Neck: Neck supple. No neck rigidity present.   Cardiovascular: Normal rate, regular rhythm and normal heart sounds.   Pulmonary/Chest: Effort normal and breath sounds normal. No respiratory " distress. She has no wheezes. She has no rhonchi. She has no rales.   Abdominal: Bowel sounds are normal. Soft. flat abdomen There is abdominal tenderness in the epigastric area and suprapubic area. There is no rebound, no guarding, no tenderness at McBurney's point, negative Beal's sign, no left CVA tenderness, negative Rovsing's sign, negative psoas sign, no right CVA tenderness and negative obturator sign.   Lymphadenopathy:     She has no cervical adenopathy.   Neurological: She is alert.   Skin: Skin is warm, dry and not diaphoretic.   Psychiatric: Her behavior is normal.   Nursing note and vitals reviewed.        Assessment:     1. Acute cystitis with hematuria    2. Dysuria    3. Cough, unspecified type    4. Gastroesophageal reflux disease, unspecified whether esophagitis present          Results for orders placed or performed in visit on 08/25/24   POCT Urinalysis, Dipstick, Automated, W/O Scope   Result Value Ref Range    POC Blood, Urine Positive (A) Negative    POC Bilirubin, Urine Positive (A) Negative    POC Urobilinogen, Urine 8.0 eu/dl 0.1 - 1.1    POC Ketones, Urine Positive (A) Negative    POC Protein, Urine Positive (A) Negative    POC Nitrates, Urine Negative Negative    POC Glucose, Urine Negative Negative    pH, UA 6.0     POC Specific Gravity, Urine 1.025 1.003 - 1.029    POC Leukocytes, Urine Positive (A) Negative   SARS Coronavirus 2 Antigen, POCT Manual Read   Result Value Ref Range    SARS Coronavirus 2 Antigen Negative Negative     Acceptable Yes        Plan:   Discussed UA results and COVID negative results with patient  Discussed sending UA for microanalysis with reflex culture to hospital laboratory with patient  Discussed Macrobid use for acute cystitis, obtaining medication today, and taking full course of antibiotics even with symptom improvement  Discussed increasing Pepcid use to 20 mg b.i.d.  Discussed importance of following up with primary care provider for  increasing GERD symptoms  Discussed a B.R.A.T. diet with patient and foods to avoid such as fried, spicy, or foods that contain dairy.  Discussed ER precautions and when to seek immediate medical care for worsening symptoms  Discussed Zofran use for nausea  Please read all educational material provided at visit      Acute cystitis with hematuria  -     nitrofurantoin, macrocrystal-monohydrate, (MACROBID) 100 MG capsule; Take 1 capsule (100 mg total) by mouth 2 (two) times daily. for 7 days  Dispense: 14 capsule; Refill: 0  -     Urinalysis, Reflex to Urine Culture    Dysuria  -     POCT Urinalysis, Dipstick, Automated, W/O Scope    Cough, unspecified type  -     SARS Coronavirus 2 Antigen, POCT Manual Read    Gastroesophageal reflux disease, unspecified whether esophagitis present  -     famotidine (PEPCID) 20 MG tablet; Take 1 tablet (20 mg total) by mouth 2 (two) times daily.  Dispense: 60 tablet; Refill: 0  -     ondansetron (ZOFRAN-ODT) 8 MG TbDL; Take 1 tablet (8 mg total) by mouth every 6 (six) hours as needed (Nausea/vomiting).  Dispense: 10 tablet; Refill: 1        Please note: This chart was completed via voice to text dictation. It may contain typographical/word recognition errors. If there are any questions, please contact the provider for final clarification.

## 2024-08-26 ENCOUNTER — OFFICE VISIT (OUTPATIENT)
Dept: URGENT CARE | Facility: CLINIC | Age: 64
End: 2024-08-26
Payer: COMMERCIAL

## 2024-08-26 VITALS
OXYGEN SATURATION: 97 % | BODY MASS INDEX: 34.87 KG/M2 | HEART RATE: 68 BPM | HEIGHT: 63 IN | SYSTOLIC BLOOD PRESSURE: 116 MMHG | WEIGHT: 196.81 LBS | DIASTOLIC BLOOD PRESSURE: 77 MMHG | RESPIRATION RATE: 18 BRPM | TEMPERATURE: 98 F

## 2024-08-26 DIAGNOSIS — R30.1 PAINFUL BLADDER SPASM: Primary | ICD-10-CM

## 2024-08-26 PROCEDURE — 99203 OFFICE O/P NEW LOW 30 MIN: CPT | Mod: S$PBB,,, | Performed by: NURSE PRACTITIONER

## 2024-08-26 PROCEDURE — 99215 OFFICE O/P EST HI 40 MIN: CPT | Mod: PBBFAC | Performed by: NURSE PRACTITIONER

## 2024-08-26 RX ORDER — PHENAZOPYRIDINE HYDROCHLORIDE 200 MG/1
200 TABLET, FILM COATED ORAL 3 TIMES DAILY PRN
Qty: 9 TABLET | Refills: 0 | Status: SHIPPED | OUTPATIENT
Start: 2024-08-26 | End: 2024-08-29

## 2024-08-27 LAB — BACTERIA UR CULT: NORMAL

## 2024-08-27 NOTE — PROGRESS NOTES
"Subjective:      Patient ID: Gabriela Landin is a 64 y.o. female.    Vitals:  height is 5' 3" (1.6 m) and weight is 89.3 kg (196 lb 12.8 oz). Her oral temperature is 98.3 °F (36.8 °C). Her blood pressure is 116/77 and her pulse is 68. Her respiration is 18 and oxygen saturation is 97%.     Chief Complaint: Abdominal Pain (Last seen yesterday)    Abdominal Pain     Patient presents with complaints of abdominal cramping and pain.  Patient was seen yesterday and was diagnosed with UTI and was prescribed Macrobid.  Patient reports starting Macrobid yesterday however abdominal cramps have not subsided.  Patient does reports some history of constipation as her last bowel movement was 3 days ago but this is normal patterns for her as she only has bowel movements 2-3 times a week.  Patient still reports Flagyl Patient described abdominal pain as cramping and intermittent. Patient states cramping has started to subside since taking Norco at 8:00 p.m. tonight.  Patient denies fever, headache, dizziness, shortness for breath or chest pain, nausea or vomiting.  Last bowel movement 3 days ago    Gastrointestinal:  Positive for abdominal pain.      Objective:     Physical Exam   Constitutional: She is oriented to person, place, and time. She appears well-developed. She is cooperative.  Non-toxic appearance. She does not appear ill. No distress. awake  HENT:   Head: Atraumatic.   Nose: No purulent discharge. Right sinus exhibits no maxillary sinus tenderness and no frontal sinus tenderness. Left sinus exhibits no maxillary sinus tenderness and no frontal sinus tenderness.   Mouth/Throat: Uvula is midline.   Eyes: Conjunctivae are normal. Right eye exhibits no discharge. Left eye exhibits no discharge. Extraocular movement intact   Neck: Neck supple. No neck rigidity present.   Cardiovascular: Regular rhythm.   Pulmonary/Chest: Effort normal and breath sounds normal. No respiratory distress. She has no wheezes. She has no " rhonchi. She has no rales.   Abdominal: Normal appearance. She exhibits no distension and no mass. There is no abdominal tenderness. There is no rebound, no guarding, no tenderness at McBurney's point, negative Beal's sign, no left CVA tenderness, negative Rovsing's sign, negative psoas sign, no right CVA tenderness and negative obturator sign. No hernia.     Lymphadenopathy:     She has no cervical adenopathy.   Neurological: no focal deficit. She is alert, oriented to person, place, and time and at baseline. No sensory deficit.   Skin: Skin is warm, dry and not diaphoretic. Capillary refill takes less than 2 seconds.   Psychiatric: Her behavior is normal. Mood, judgment and thought content normal.   Nursing note and vitals reviewed.chaperone present (Son)         Assessment:     1. Painful bladder spasm        Plan:   Abdominal exam benign  Strict ER precautions given  Patient prescribed Pyridium for Suspected bladder spasms related to UTI    Painful bladder spasm  -     phenazopyridine (PYRIDIUM) 200 MG tablet; Take 1 tablet (200 mg total) by mouth 3 (three) times daily as needed for Pain.  Dispense: 9 tablet; Refill: 0          Medical Decision Making:   Differential Diagnosis:   Diverticulitis, diverticulosis, colitis, appendicitis among others

## 2024-08-27 NOTE — PATIENT INSTRUCTIONS
Please follow instructions on patient education material.      Return to urgent care in 2 to 3 days if symptoms are not improving, immediately if you develop any new or worsening symptoms.   As directed your prescribed Pyridium for bladder spasms related to UTI however instructed to seek ER evaluation if spasms development of severe abdominal pain or significant lower abdominal pain with fever or increasing abdominal pain that does not subside with treatment prescribed today.    - Increase your water intake to 5-6 bottles per day  - If you start to have fevers 100.4+, significant lower back/lower abdominal pain, vomiting, chills/body aches, or worsening bladder/urination symptoms - go to the ER.